# Patient Record
Sex: FEMALE | Race: ASIAN | NOT HISPANIC OR LATINO | Employment: UNEMPLOYED | URBAN - METROPOLITAN AREA
[De-identification: names, ages, dates, MRNs, and addresses within clinical notes are randomized per-mention and may not be internally consistent; named-entity substitution may affect disease eponyms.]

---

## 2017-01-06 ENCOUNTER — OFFICE VISIT - HEALTHEAST (OUTPATIENT)
Dept: AUDIOLOGY | Facility: CLINIC | Age: 37
End: 2017-01-06

## 2017-01-06 ENCOUNTER — OFFICE VISIT - HEALTHEAST (OUTPATIENT)
Dept: OTOLARYNGOLOGY | Facility: CLINIC | Age: 37
End: 2017-01-06

## 2017-01-06 DIAGNOSIS — H90.71 MIXED HEARING LOSS OF RIGHT EAR: ICD-10-CM

## 2017-01-06 ASSESSMENT — MIFFLIN-ST. JEOR: SCORE: 1459

## 2017-01-31 ENCOUNTER — COMMUNICATION - HEALTHEAST (OUTPATIENT)
Dept: ADMINISTRATIVE | Facility: CLINIC | Age: 37
End: 2017-01-31

## 2017-02-08 ENCOUNTER — OFFICE VISIT - HEALTHEAST (OUTPATIENT)
Dept: AUDIOLOGY | Facility: CLINIC | Age: 37
End: 2017-02-08

## 2017-02-08 DIAGNOSIS — H90.8 MIXED HEARING LOSS, UNILATERAL: ICD-10-CM

## 2017-02-10 ENCOUNTER — OFFICE VISIT - HEALTHEAST (OUTPATIENT)
Dept: FAMILY MEDICINE | Facility: CLINIC | Age: 37
End: 2017-02-10

## 2017-02-10 DIAGNOSIS — F17.210 CIGARETTE NICOTINE DEPENDENCE WITHOUT COMPLICATION: ICD-10-CM

## 2017-02-10 DIAGNOSIS — G89.29 CHRONIC LEFT-SIDED LOW BACK PAIN WITHOUT SCIATICA: ICD-10-CM

## 2017-02-10 DIAGNOSIS — M54.50 CHRONIC LEFT-SIDED LOW BACK PAIN WITHOUT SCIATICA: ICD-10-CM

## 2017-02-10 DIAGNOSIS — F43.10 PTSD (POST-TRAUMATIC STRESS DISORDER): ICD-10-CM

## 2017-03-17 ENCOUNTER — AMBULATORY - HEALTHEAST (OUTPATIENT)
Dept: AUDIOLOGY | Facility: CLINIC | Age: 37
End: 2017-03-17

## 2017-05-05 ENCOUNTER — COMMUNICATION - HEALTHEAST (OUTPATIENT)
Dept: FAMILY MEDICINE | Facility: CLINIC | Age: 37
End: 2017-05-05

## 2017-05-05 DIAGNOSIS — F17.210 CIGARETTE NICOTINE DEPENDENCE WITHOUT COMPLICATION: ICD-10-CM

## 2017-05-08 ENCOUNTER — COMMUNICATION - HEALTHEAST (OUTPATIENT)
Dept: FAMILY MEDICINE | Facility: CLINIC | Age: 37
End: 2017-05-08

## 2017-05-08 DIAGNOSIS — F17.210 CIGARETTE NICOTINE DEPENDENCE WITHOUT COMPLICATION: ICD-10-CM

## 2017-08-21 ENCOUNTER — COMMUNICATION - HEALTHEAST (OUTPATIENT)
Dept: FAMILY MEDICINE | Facility: CLINIC | Age: 37
End: 2017-08-21

## 2017-08-21 DIAGNOSIS — F17.210 CIGARETTE NICOTINE DEPENDENCE WITHOUT COMPLICATION: ICD-10-CM

## 2017-10-11 ENCOUNTER — OFFICE VISIT - HEALTHEAST (OUTPATIENT)
Dept: FAMILY MEDICINE | Facility: CLINIC | Age: 37
End: 2017-10-11

## 2017-10-11 DIAGNOSIS — Z23 NEED FOR VACCINATION: ICD-10-CM

## 2017-10-11 DIAGNOSIS — M22.2X1 PATELLOFEMORAL PAIN SYNDROME OF RIGHT KNEE: ICD-10-CM

## 2017-10-11 DIAGNOSIS — H04.123 DRY EYES: ICD-10-CM

## 2017-10-11 RX ORDER — POLYVINYL ALCOHOL 14 MG/ML
SOLUTION/ DROPS OPHTHALMIC
Qty: 15 ML | Refills: 11 | Status: SHIPPED | OUTPATIENT
Start: 2017-10-11

## 2017-10-11 ASSESSMENT — MIFFLIN-ST. JEOR: SCORE: 1472.61

## 2017-12-11 ENCOUNTER — COMMUNICATION - HEALTHEAST (OUTPATIENT)
Dept: FAMILY MEDICINE | Facility: CLINIC | Age: 37
End: 2017-12-11

## 2017-12-11 DIAGNOSIS — F17.210 CIGARETTE NICOTINE DEPENDENCE WITHOUT COMPLICATION: ICD-10-CM

## 2018-03-27 ENCOUNTER — COMMUNICATION - HEALTHEAST (OUTPATIENT)
Dept: FAMILY MEDICINE | Facility: CLINIC | Age: 38
End: 2018-03-27

## 2018-03-27 DIAGNOSIS — F17.210 CIGARETTE NICOTINE DEPENDENCE WITHOUT COMPLICATION: ICD-10-CM

## 2019-12-11 ENCOUNTER — OFFICE VISIT - HEALTHEAST (OUTPATIENT)
Dept: FAMILY MEDICINE | Facility: CLINIC | Age: 39
End: 2019-12-11

## 2019-12-11 DIAGNOSIS — M62.838 MUSCLE SPASM: ICD-10-CM

## 2019-12-11 DIAGNOSIS — R42 VERTIGO: ICD-10-CM

## 2019-12-11 DIAGNOSIS — R42 DIZZINESS: ICD-10-CM

## 2019-12-11 DIAGNOSIS — Z23 NEED FOR IMMUNIZATION AGAINST INFLUENZA: ICD-10-CM

## 2019-12-11 ASSESSMENT — MIFFLIN-ST. JEOR: SCORE: 1479.41

## 2020-02-20 ENCOUNTER — OFFICE VISIT - HEALTHEAST (OUTPATIENT)
Dept: FAMILY MEDICINE | Facility: CLINIC | Age: 40
End: 2020-02-20

## 2020-02-20 DIAGNOSIS — Z12.4 PAP SMEAR FOR CERVICAL CANCER SCREENING: ICD-10-CM

## 2020-02-20 ASSESSMENT — MIFFLIN-ST. JEOR: SCORE: 1495.29

## 2020-03-05 ENCOUNTER — OFFICE VISIT - HEALTHEAST (OUTPATIENT)
Dept: FAMILY MEDICINE | Facility: CLINIC | Age: 40
End: 2020-03-05

## 2020-03-05 DIAGNOSIS — Z12.4 SCREENING FOR CERVICAL CANCER: ICD-10-CM

## 2020-03-05 DIAGNOSIS — Z11.3 SCREEN FOR STD (SEXUALLY TRANSMITTED DISEASE): ICD-10-CM

## 2020-03-11 ENCOUNTER — COMMUNICATION - HEALTHEAST (OUTPATIENT)
Dept: FAMILY MEDICINE | Facility: CLINIC | Age: 40
End: 2020-03-11

## 2020-03-26 ENCOUNTER — COMMUNICATION - HEALTHEAST (OUTPATIENT)
Dept: FAMILY MEDICINE | Facility: CLINIC | Age: 40
End: 2020-03-26

## 2020-03-26 DIAGNOSIS — R42 VERTIGO: ICD-10-CM

## 2020-07-16 ENCOUNTER — OFFICE VISIT - HEALTHEAST (OUTPATIENT)
Dept: FAMILY MEDICINE | Facility: CLINIC | Age: 40
End: 2020-07-16

## 2020-07-16 DIAGNOSIS — L30.9 DERMATITIS: ICD-10-CM

## 2020-07-16 RX ORDER — CETIRIZINE HYDROCHLORIDE 10 MG/1
10 TABLET ORAL DAILY
Qty: 30 TABLET | Refills: 2 | Status: SHIPPED | OUTPATIENT
Start: 2020-07-16

## 2020-07-16 RX ORDER — TRIAMCINOLONE ACETONIDE 1 MG/G
CREAM TOPICAL
Qty: 30 G | Refills: 3 | Status: SHIPPED | OUTPATIENT
Start: 2020-07-16

## 2020-07-16 ASSESSMENT — MIFFLIN-ST. JEOR: SCORE: 1517.97

## 2020-12-23 ENCOUNTER — COMMUNICATION - HEALTHEAST (OUTPATIENT)
Dept: FAMILY MEDICINE | Facility: CLINIC | Age: 40
End: 2020-12-23

## 2020-12-23 DIAGNOSIS — R42 VERTIGO: ICD-10-CM

## 2020-12-23 DIAGNOSIS — M62.838 MUSCLE SPASM: ICD-10-CM

## 2020-12-24 RX ORDER — IBUPROFEN 600 MG/1
TABLET, FILM COATED ORAL
Qty: 50 TABLET | Refills: 1 | Status: SHIPPED | OUTPATIENT
Start: 2020-12-24

## 2020-12-24 RX ORDER — MECLIZINE HCL 25 MG/1
TABLET, CHEWABLE ORAL
Qty: 30 TABLET | Refills: 1 | Status: SHIPPED | OUTPATIENT
Start: 2020-12-24

## 2021-05-30 VITALS — BODY MASS INDEX: 34.23 KG/M2 | HEIGHT: 62 IN | WEIGHT: 186 LBS

## 2021-05-31 VITALS — HEIGHT: 62 IN | BODY MASS INDEX: 34.78 KG/M2 | WEIGHT: 189 LBS

## 2021-06-04 VITALS
HEART RATE: 75 BPM | OXYGEN SATURATION: 99 % | HEIGHT: 62 IN | TEMPERATURE: 98.1 F | RESPIRATION RATE: 16 BRPM | DIASTOLIC BLOOD PRESSURE: 80 MMHG | WEIGHT: 190.5 LBS | SYSTOLIC BLOOD PRESSURE: 110 MMHG | BODY MASS INDEX: 35.06 KG/M2

## 2021-06-04 VITALS
TEMPERATURE: 98.1 F | HEART RATE: 80 BPM | BODY MASS INDEX: 36.62 KG/M2 | OXYGEN SATURATION: 99 % | WEIGHT: 199 LBS | HEIGHT: 62 IN | DIASTOLIC BLOOD PRESSURE: 80 MMHG | SYSTOLIC BLOOD PRESSURE: 108 MMHG | RESPIRATION RATE: 16 BRPM

## 2021-06-04 VITALS
DIASTOLIC BLOOD PRESSURE: 70 MMHG | RESPIRATION RATE: 16 BRPM | BODY MASS INDEX: 35.7 KG/M2 | HEIGHT: 62 IN | HEART RATE: 72 BPM | TEMPERATURE: 98 F | SYSTOLIC BLOOD PRESSURE: 104 MMHG | OXYGEN SATURATION: 98 % | WEIGHT: 194 LBS

## 2021-06-04 NOTE — PROGRESS NOTES
"Petey Douglas is a 39 y.o. female here for vertigo    ASSESSMENT/PLAN:   Petey was seen today for dizziness, nausea and emesis.    Diagnoses and all orders for this visit:    Dizziness  Vertigo  Story and exam consistent with BPPV. Declined PT referral today, will start with meclizine PRN. If no improvement, will consider short course of lorazepam for sleep and symptoms.   -     meclizine (ANTIVERT) 25 mg tablet; Take 1 tablet (25 mg total) by mouth 3 (three) times a day as needed for dizziness or nausea.  - Consider short course of lorazepam if no improvement   - Discuss PT for vestibular rehab    Need for immunization against influenza  -     Influenza, Seasonal Quad, PF =/> 6months    Muscle spasm  -     ibuprofen (ADVIL,MOTRIN) 600 MG tablet; Take 1 tablet 3 times daily as needed for pain      Return if symptoms worsen or fail to improve.       ======================================================    SUBJECTIVE  Petey Douglas is a 39 y.o. female here for dizziness.     On Monday morning she felt fine laying in bed. Turned over suddenly and felt the room spinning.   Feeling lightheaded when she sits still, but no dizziness. Worse when laying down or when making sudden movements with her head. Has never happened before. Has vomited a few times from the room spinning. No nausea otherwise. No numbness/tingling, no weakness, no vision changes. No new hearing loss.     Was evaluated for hearing aids in 2017, but did not know when to pick them up and never followed up.     ROS  Complete 10 point review of systems negative except as noted above in HPI    Reviewed Past Medical History, Medications, Family History and Social History in Epic and up to date with no new changes.    OBJECTIVE  /80   Pulse 75   Temp 98.1  F (36.7  C) (Oral)   Resp 16   Ht 5' 1.5\" (1.562 m)   Wt 190 lb 8 oz (86.4 kg)   LMP 11/20/2019 (Within Days)   SpO2 99%   BMI 35.41 kg/m       General: Cooperative, pleasant, in no acute distress  HEENT: " PERRL, EOMI. TM normal.   Neck: no lymphadenopathy, no masses  CV: RRR, normal S1/S2, no murmur, rubs, gallops  Resp: No respiratory distress. Clear to auscultation bilaterally. No wheezes, rales, rhonchi  MSK: Normal muscle tone  Neuro: Horizontal nystagmus R>L. Hearing loss in right. No vertical nystagmus.   Skin: warm, well perfused. No rashes    LABS & IMAGES   None indicated    ======================================================    Visit was completed along with in person Madiha     Options for treatment and follow-up care were reviewed with the patient. Mu Paw and/or guardian was engaged and actively involved in the decision making process. Mu Paw and/or guardian verbalized understanding of the options discussed and was satisfied with the final plan.      Jaspal Wharton MD

## 2021-06-06 NOTE — TELEPHONE ENCOUNTER
Pt missed her pap appt with Dr. Wharton on 3/5/2020. If pt calls back, please help her reschedule.

## 2021-06-06 NOTE — PROGRESS NOTES
"Petey Douglas is a 39 y.o. female here for discuss Pap smear    ASSESSMENT/PLAN:   Petey was seen today for gynecologic exam.    Diagnoses and all orders for this visit:    Pap smear for cervical cancer screening  Patient wanted discussed what a Pap smear is, indications for exam.  She does not think she is ever had one, last child was 7 years ago and does not think she had one at that time either.  Status post tubal ligation in 2012.  No family history of cervical, ovarian, breast cancer.  Former smoker, no alcohol.  Discussed importance of screening for cancer, patient is on her period and wants to reschedule for 2 weeks.      Return in about 2 weeks (around 3/5/2020) for Pap smear.       ======================================================    SUBJECTIVE  Petey Douglas is a 39 y.o. female here for discuss Pap smear.    Patient has never had a Pap smear that she knows of.  Has had children before, last one was over 7 years ago.  No family history of breast or cervical cancer.    She is currently on her period and would like to defer this test today but wants to reschedule for another 1.    ROS  Complete 10 point review of systems negative except as noted above in HPI    Reviewed Past Medical History, Medications, Family History and Social History in Epic and up to date with no new changes.    OBJECTIVE  /70   Pulse 72   Temp 98  F (36.7  C) (Oral)   Resp 16   Ht 5' 1.5\" (1.562 m)   Wt 194 lb (88 kg)   LMP 02/17/2020 (Approximate)   SpO2 98%   BMI 36.06 kg/m       General: Cooperative, pleasant, in no acute distress  HEENT: PERRL, EOMI.    MSK: Normal muscle tone  Neuro: CN II-XII intact      LABS & IMAGES   Results for orders placed or performed in visit on 07/07/12   Hemoglobin   Result Value Ref Range    Hemoglobin 10.5 (L) 12.0 - 16.0 g/dL         ======================================================    Visit was completed along with in person Madiha     Options for treatment and follow-up care were " reviewed with the patient.  Paw and/or guardian was engaged and actively involved in the decision making process. Mu Paw and/or guardian verbalized understanding of the options discussed and was satisfied with the final plan.      Jaspal Wharton MD

## 2021-06-07 NOTE — TELEPHONE ENCOUNTER
RN cannot approve Refill Request    RN can NOT refill this medication med is not covered by policy/route to provider     . Last office visit: 3/5/2020 Jaspal Wharton MD Last Physical: Visit date not found Last MTM visit: Visit date not found Last visit same specialty: 3/5/2020 Jaspal Wharton MD.  Next visit within 3 mo: Visit date not found  Next physical within 3 mo: Visit date not found      Bertha Avalos, [facility] Triage/Med Refill 3/27/2020    Requested Prescriptions   Pending Prescriptions Disp Refills     TRAVEL SICKNESS, MECLIZINE, 25 mg chewable tablet [Pharmacy Med Name: MECLIZINE 25 MG TAB CHEW 25 TAB] 30 tablet 1     Sig: TAKE 1 TABLET (25 MG TOTAL) BY MOUTH 3 (THREE) TIMES A DAY AS NEEDED FOR DIZZINESS OR NAUSEA.       There is no refill protocol information for this order

## 2021-06-08 NOTE — PROGRESS NOTES
Audiology Report    Referring Provider:   Service    History:  Petey Douglas is seen in conjunction with ENT appointment today. 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 She is being seen to monitor her hearing since she was last tested on 9/11/15. Previous reports state the hearing loss was first noticed in 2008 following a high fever and measles. Petey reports that she has not experienced a change in hearing since last year.  She denies a history of otalgia, dizziness, history of noise exposure and otorrhea, and noise exposure.    Results:     Left Ear Right Ear   Otoscopy clear canals clear canals   Pure Tone Audiometry normal hearing   moderate sloping to profound sensorineural hearing loss   Word Recognition excellent very poor as completed through an    Tympanometry normal (Type A)  normal (Type A)     Transducer: Insert earphones    Reliability was good  and there was good  SRT to PTA agreement. Results today were stable with those found on 9/11/15 with a slight decrease in hearing from 2182-2464 Hz in the right ear.      Plan:  The patient is returned to ENT for follow up.  She should return for retesting with ENT recommendation.  The patient is a potential candidate for a BAHA or CROS device. She is welcome to return for a hearing aid evaluation if she is interested after medical clearanc is obtained.    Please see audiogram under  media  and  audiogram  in the patient s chart.     Juwan Milian., CCC-A  [place] Licensed Audiologist #[id number]

## 2021-06-08 NOTE — PROGRESS NOTES
Assessment: /    Plan:    1. PTSD (post-traumatic stress disorder)     2. Chronic left-sided low back pain without sciatica  ibuprofen (ADVIL,MOTRIN) 600 MG tablet   3. Cigarette nicotine dependence without complication  nicotine polacrilex (NICORETTE) 4 MG gum       I completed form N-648 and gave the additional to the patient to take immigration.  She has an appointment there to 20/8/17.  Kiara is assisting her.  Patient was seen with Madiha , Georgina Berger.  This was a 25 minute visit with >50% of the time spent in counseling and coordination of care regarding:  PTSD      Subjective:    HPI:  Petey Douglas is a 36-year-old female presenting for citizenship waiver.  She has PTSD.  Please see note from 6/15/16 regarding this.  She needs form N-648 completed with a new code.    She also requests refill of ibuprofen and nicotine gum.      Review of Systems:  No fever or cough.      Current Outpatient Prescriptions   Medication Sig Dispense Refill     acetaminophen (TYLENOL) 325 MG tablet Take 2 tablets (650 mg total) by mouth every 6 (six) hours as needed. 100 tablet 12     ibuprofen (ADVIL,MOTRIN) 600 MG tablet Take 1 tablet 3 times daily as needed for pain 50 tablet 11     nicotine polacrilex (NICORETTE) 4 MG gum Apply 1 each (4 mg total) to the mouth or throat as needed for smoking cessation. 100 each 2     No current facility-administered medications for this visit.          Objective:    There were no vitals filed for this visit.    Gen:  NAD, VSS  Lungs:  normal  Heart:  normal          ADDITIONAL HISTORY SUMMARIZED (2): None.  DECISION TO OBTAIN EXTRA INFORMATION (1): None.   RADIOLOGY TESTS (1): None.  LABS (1): None.  MEDICINE TESTS (1): None.  INDEPENDENT REVIEW (2 each): None.     Total Data Points: 0

## 2021-06-08 NOTE — PROGRESS NOTES
HPI:  Saw [facility] in 2015 and recommended CT scna for potential reversible hearing loss.  Ct scan showed:  CONCLUSION:  1. Mastoid air cells, middle ear and external canals clear bilaterally. Normal ossicular chains bilaterally.  2. Inner ear structures within normal limits bilaterally.  3. Mucosal thickening in the ethmoid air cells bilaterally.    Past medical history, surgical history, social history, family history, medications, and allergies have been reviewed with the patient and are documented above.    Review of Systems: a 10-system review was performed. Pertinent positives are noted in the HPI and on a separate scanned document in the chart.    PHYSICAL EXAMINATION:  GEN: no acute distress, normocephalic  EYES: extraocular movements are intact, pupils are equal and round. Sclera clear.   EARS: auricles are normally formed. The external auditory canals are clear with minimal to no cerumen. Tympanic membranes are intact bilaterally with no signs of infection, effusion, retractions, or perforations.  NEURO: CN II-XII are intact bilaterally. alert and oriented. No nystagmus. Gait is normal.  PULM: breathing comfortably on room air, normal chest expansion with respiration  HEART: regular rate and rhythm, no peripheral edema    AUDIOGRAM:   Prfound mixed loss on the right with 0% word recognition    MEDICAL DECISION-MAKING:   Nothing conspicuous to explain the conductive component on the right.  I think it is unlikely that surgery will improve the hearing considering the word recognition.  We discussed BAHA and CROS options and she would like to get more information for this.  Will get her set up with [facility].

## 2021-06-08 NOTE — PROGRESS NOTES
"Audiology only; hearing aid evaluation ([facility] patient)    Petey Douglas was accompanied today by a male, Madiha-speaking .  Medical clearance for amplification was provided by Holger Watt MD, at ENT consult dated 1-6-17.  The benefits and limitations of amplification were discussed, and a mutual decision was made for a fit with a [employer]  -in-canal device (in color \"velvet black\" [P8]), coupled to the left ear with an xS  and a generic open dome. She will also be fit with a CROS B-312 device in the right ear.   Order will be placed via phone; purchase requisition will be completed online.  Fitting was scheduled 3-10-17 at [facility].  Petey Douglas expressed verbal understanding of this information via the ; an appointment card for the fitting was also provided.    Juana Robles, [facility]-A  [place] Licensed Audiologist [id number]    "

## 2021-06-09 NOTE — PROGRESS NOTES
Patient did not keep hearing aid fitting appointment 3-10-17; no responses were obtained via phone attempts to reschedule. Devices will be returned to  at this time, as 's return date on invoice is approaching with no fitting appointments available with examiner prior to invoice date. If Mu Misael does wish to proceed with amplification, she may reschedule hearing aid evaluation to start process anew.    Juwan Robles., [facility]A  [place] Licensed Audiologist [id number]

## 2021-06-09 NOTE — PROGRESS NOTES
"ASSESSMENT:    1. Dermatitis I have prescribed cetirizine to help with itching at night and I have prescribed a medium potency corticosteroid ointment to be applied follow-up recommended in 4 weeks if her symptoms have not improved cetirizine (ZYRTEC) 10 MG tablet    triamcinolone (KENALOG) 0.1 % cream       There are no Patient Instructions on file for this visit.    No orders of the defined types were placed in this encounter.    There are no discontinued medications.    No follow-ups on file.    CHIEF COMPLAINT:  Chief Complaint   Patient presents with     Itchy skin     x3 week       HISTORY OF PRESENT ILLNESS:  Petey is a 40 y.o. female who is here today to discuss a skin rash that is primarily present on her hands arms feet and legs it started about 3 weeks ago she notes that it happens when she has been fishing or in contact with water she is been scratching and itching her arms and legs regularly.  She is never had this problem before.  She denies any shortness of breath, swelling of her lips problem swallowing headaches or diarrhea.    REVIEW OF SYSTEMS:   Skin as mentioned is itching on her arms legs hands and feet  According to patient 10 point review  All other systems are negative.    PFS:  Certified  reviewed her medical and social history as me    TOBACCO USE:  Social History     Tobacco Use   Smoking Status Former Smoker     Types: Cigarettes     Last attempt to quit: 11/21/2016     Years since quitting: 3.6   Smokeless Tobacco Former User   Tobacco Comment    3 cigarettes daily.       VITALS:  Vitals:    07/16/20 1132   BP: 108/80   Pulse: 80   Resp: 16   Temp: 98.1  F (36.7  C)   TempSrc: Oral   SpO2: 99%   Weight: 199 lb (90.3 kg)   Height: 5' 1.5\" (1.562 m)     Wt Readings from Last 3 Encounters:   07/16/20 199 lb (90.3 kg)   02/20/20 194 lb (88 kg)   12/11/19 190 lb 8 oz (86.4 kg)       PHYSICAL EXAM:  Interactive female sitting comfortably exam no acute distress  HEENT neck " supple mucous members moist no lymph enlargement noted in the neck  Respiratory system clear to auscultation equal breath sounds no wheeze no crackles  Skin warm well perfused multiple areas of excoriation noted on her ankles on her forearms on her wrists.  No evidence of any hives noted.      DATA REVIEWED:  Additional History from Old Records Summarized (2): 0  Decision to Obtain Records (1): 0  Radiology Tests Summarized or Ordered (1): 00  Labs Reviewed or Ordered (1): 0  Medicine Test Summarized or Ordered (1): 0  Independent Review of EKG or X-RAY(2 each): 0    The visit lasted a total of 15 minutes face to face with the patient. Over 50% of the time was spent counseling and educating the patient about   Dermatitis.    MEDICATIONS:  Current Outpatient Medications   Medication Sig Dispense Refill     acetaminophen (TYLENOL) 325 MG tablet Take 2 tablets (650 mg total) by mouth every 6 (six) hours as needed. 100 tablet 12     ibuprofen (ADVIL,MOTRIN) 600 MG tablet Take 1 tablet 3 times daily as needed for pain 50 tablet 11     nicotine polacrilex (NICORETTE) 4 MG gum CHEW 1 EACH (4 MG TOTAL) BY MOUTH AS NEEDED FOR SMOKING CESSATION. 100 each 2     TRAVEL SICKNESS, MECLIZINE, 25 mg chewable tablet TAKE 1 TABLET (25 MG TOTAL) BY MOUTH 3 (THREE) TIMES A DAY AS NEEDED FOR DIZZINESS OR NAUSEA. 30 tablet 1     polyvinyl alcohol (ARTIFICIAL TEARS, POLYVIN ALC,) 1.4 % ophthalmic solution 1 drop in both eyes 4 times daily 15 mL 11     No current facility-administered medications for this visit.      Elbert BALDWIN

## 2021-06-13 NOTE — PROGRESS NOTES
Assessment: /    Plan:    1. Patellofemoral pain syndrome of right knee  ibuprofen (ADVIL,MOTRIN) 600 MG tablet   2. Dry eyes  polyvinyl alcohol (ARTIFICIAL TEARS, POLYVIN ALC,) 1.4 % ophthalmic solution   3. Need for vaccination         She will use ibuprofen as needed.  She will use hot or cold packs as needed.  Avoid squatting, kneeling, taking 2 stairs at a time.  Recheck if not improving.  Patient was seen with Madiha , Angelita Garcia.      Subjective:    HPI:  Petey Douglas is a 37-year-old female presenting with right knee pain.  This has been occurring for 5 months.  Pain has been worse recently.  Pain is located in the medial aspect of the distal quadriceps.  Pain is worse when she descends stairs.  She has not been taking medication for this.  The area sometimes feels swollen.      Review of Systems: No redness of the joint, no stiffness, no other joints affected.      Current Outpatient Prescriptions   Medication Sig Dispense Refill     acetaminophen (TYLENOL) 325 MG tablet Take 2 tablets (650 mg total) by mouth every 6 (six) hours as needed. 100 tablet 12     ibuprofen (ADVIL,MOTRIN) 600 MG tablet Take 1 tablet 3 times daily as needed for pain 50 tablet 11     nicotine polacrilex (NICORETTE) 4 MG gum CHEW 1 EACH (4 MG TOTAL) BY MOUTH AS NEEDED FOR SMOKING CESSATION. 100 each 2     polyvinyl alcohol (ARTIFICIAL TEARS, POLYVIN ALC,) 1.4 % ophthalmic solution 1 drop in both eyes 4 times daily 15 mL 11     No current facility-administered medications for this visit.          Objective:    Vitals:    10/11/17 1329   BP: 114/70   Pulse: 73   Resp: 17   Temp: 98.2  F (36.8  C)   SpO2: 100%       Gen:  NAD, VSS  Eyes normal  Lungs:  normal  Heart:  normal  Right knee with slight crepitance of the patella with movement.  Tenderness of the medial distal quadriceps muscle.  No effusion, increased warmth, erythema.  No ligament instability, Lauri's negative.  Left knee is normal      ADDITIONAL HISTORY SUMMARIZED  (2): None.  DECISION TO OBTAIN EXTRA INFORMATION (1): None.   RADIOLOGY TESTS (1): None.  LABS (1): None.  MEDICINE TESTS (1): None.  INDEPENDENT REVIEW (2 each): None.     Total Data Points: 0

## 2021-06-14 NOTE — TELEPHONE ENCOUNTER
RN cannot approve Refill Request    RN can NOT refill this medication Protocol failed and NO refill given. Last office visit: 10/11/2017 Stefan Friend MD Last Physical: Visit date not found Last MTM visit: Visit date not found Last visit same specialty: 7/16/2020 Jean-Claude Bauman MD.  Next visit within 3 mo: Visit date not found  Next physical within 3 mo: Visit date not found      Milagro Zuniga, [facility] Triage/Med Refill 12/24/2020    Requested Prescriptions   Pending Prescriptions Disp Refills     TRAVEL SICKNESS, MECLIZINE, 25 mg chewable tablet [Pharmacy Med Name: MECLIZINE 25 MG TAB CHEW 25 Tablet] 30 tablet 1     Sig: TAKE 1 TABLET (25 MG TOTAL) BY MOUTH 3 (THREE) TIMES A DAY AS NEEDED FOR DIZZINESS OR NAUSEA.       There is no refill protocol information for this order

## 2021-06-14 NOTE — TELEPHONE ENCOUNTER
RN cannot approve Refill Request    RN can NOT refill this medication med is not covered by policy/route to provider. Last office visit: 3/5/2020 Jaspal Wharton MD Last Physical: Visit date not found Last MTM visit: Visit date not found Last visit same specialty: 7/16/2020 Jean-Claude Bauman MD.  Next visit within 3 mo: Visit date not found  Next physical within 3 mo: Visit date not found      Milagro Zuniga, [facility] Triage/Med Refill 12/24/2020    Requested Prescriptions   Pending Prescriptions Disp Refills     ibuprofen (ADVIL,MOTRIN) 600 MG tablet [Pharmacy Med Name: IBUPROFEN 600 MG TABS 600 Tablet] 50 tablet 11     Sig: TAKE 1 TABLET BY MOUTH 3 TIMES DAILY AS NEEDED FOR PAIN       There is no refill protocol information for this order

## 2021-06-15 PROBLEM — G89.29 CHRONIC LEFT-SIDED LOW BACK PAIN WITHOUT SCIATICA: Status: ACTIVE | Noted: 2017-02-10

## 2021-06-15 PROBLEM — M54.50 CHRONIC LEFT-SIDED LOW BACK PAIN WITHOUT SCIATICA: Status: ACTIVE | Noted: 2017-02-10

## 2021-06-15 PROBLEM — H90.71 MIXED HEARING LOSS OF RIGHT EAR: Status: ACTIVE | Noted: 2017-01-06

## 2021-06-16 PROBLEM — H04.123 DRY EYES: Status: ACTIVE | Noted: 2017-10-11

## 2021-06-16 PROBLEM — M22.2X1 PATELLOFEMORAL PAIN SYNDROME OF RIGHT KNEE: Status: ACTIVE | Noted: 2017-10-11

## 2021-07-03 NOTE — ADDENDUM NOTE
Addendum Note by Rodney Marie MD at 3/5/2020 10:00 AM     Author: Rodney Marie MD Service: -- Author Type: Physician    Filed: 3/11/2020  5:12 PM Encounter Date: 3/5/2020 Status: Signed    : Rodney Marie MD (Physician)    Addended by: RODNEY MARIE on: 3/11/2020 05:12 PM        Modules accepted: Orders, Level of Service

## 2021-07-14 ENCOUNTER — TRANSCRIBE ORDERS (OUTPATIENT)
Dept: FAMILY MEDICINE | Facility: CLINIC | Age: 41
End: 2021-07-14

## 2021-07-14 ENCOUNTER — IMMUNIZATION (OUTPATIENT)
Dept: NURSING | Facility: CLINIC | Age: 41
End: 2021-07-14
Payer: COMMERCIAL

## 2021-07-14 DIAGNOSIS — Z23 HIGH PRIORITY FOR 2019-NCOV VACCINE: Primary | ICD-10-CM

## 2021-07-14 PROCEDURE — 91301 PR COVID VAC MODERNA 100 MCG/0.5 ML IM: CPT

## 2021-07-14 PROCEDURE — 0011A PR COVID VAC MODERNA 100 MCG/0.5 ML IM: CPT

## 2021-08-11 ENCOUNTER — IMMUNIZATION (OUTPATIENT)
Dept: NURSING | Facility: CLINIC | Age: 41
End: 2021-08-11
Attending: FAMILY MEDICINE
Payer: COMMERCIAL

## 2021-08-11 PROCEDURE — 91301 PR COVID VAC MODERNA 100 MCG/0.5 ML IM: CPT

## 2021-08-11 PROCEDURE — 0012A PR COVID VAC MODERNA 100 MCG/0.5 ML IM: CPT

## 2023-12-13 ENCOUNTER — OFFICE VISIT (OUTPATIENT)
Dept: FAMILY MEDICINE | Facility: CLINIC | Age: 43
End: 2023-12-13
Payer: COMMERCIAL

## 2023-12-13 VITALS
HEIGHT: 61 IN | WEIGHT: 202 LBS | TEMPERATURE: 98.2 F | HEART RATE: 84 BPM | SYSTOLIC BLOOD PRESSURE: 120 MMHG | BODY MASS INDEX: 38.14 KG/M2 | DIASTOLIC BLOOD PRESSURE: 78 MMHG | OXYGEN SATURATION: 98 % | RESPIRATION RATE: 18 BRPM

## 2023-12-13 DIAGNOSIS — M25.461 EFFUSION OF RIGHT KNEE: ICD-10-CM

## 2023-12-13 DIAGNOSIS — Z23 NEED FOR VACCINATION: ICD-10-CM

## 2023-12-13 DIAGNOSIS — M25.561 CHRONIC PAIN OF RIGHT KNEE: Primary | ICD-10-CM

## 2023-12-13 DIAGNOSIS — G89.29 CHRONIC PAIN OF RIGHT KNEE: Primary | ICD-10-CM

## 2023-12-13 PROCEDURE — 99203 OFFICE O/P NEW LOW 30 MIN: CPT | Mod: 25 | Performed by: FAMILY MEDICINE

## 2023-12-13 PROCEDURE — 91320 SARSCV2 VAC 30MCG TRS-SUC IM: CPT | Performed by: FAMILY MEDICINE

## 2023-12-13 PROCEDURE — 90686 IIV4 VACC NO PRSV 0.5 ML IM: CPT | Performed by: FAMILY MEDICINE

## 2023-12-13 PROCEDURE — 90480 ADMN SARSCOV2 VAC 1/ONLY CMP: CPT | Performed by: FAMILY MEDICINE

## 2023-12-13 PROCEDURE — 90471 IMMUNIZATION ADMIN: CPT | Performed by: FAMILY MEDICINE

## 2023-12-13 NOTE — PROGRESS NOTES
ASSESMENT AND PLAN:  Diagnoses and all orders for this visit:  Chronic pain of right knee with Effusion of right knee  -     MR Knee Right w/o Contrast; Future  Given the effusion I do think she that she needs an MRI, patient agreeable after counseling today.  Treatment plan will depend on results of the MRI.  Need for vaccination  Immunization review and counseling done with the patient.  With the help of a professional .  -     INFLUENZA VACCINE IM > 6 MONTHS VALENT IIV4 (AFLURIA/FLUZONE)  -     COVID-19 12+ (2023-24) (PFIZER)      Reviewed the risks and benefits of the treatment plan with the patient and/or caregiver and we discussed indications for routine and emergent follow-up.        SUBJECTIVE: 43-year-old female's been having 6 months of knee pain in the right knee.  It has been associated with intermittent swelling that is visible and she can also feel the swelling.  Sometimes she feels the swelling towards the back of the knee in the popliteal area.  She cannot think of any specific injury that occurred around the time of the onset of the symptoms.  She has not had other joints involved, it is only been the right knee.  Sometimes the right knee feels unstable.    No past medical history on file.  Patient Active Problem List   Diagnosis    Vitamin D Deficiency    Difficulty concentrating    PTSD (post-traumatic stress disorder)    Mixed hearing loss of right ear    Chronic left-sided low back pain without sciatica    Patellofemoral pain syndrome of right knee    Dry eyes     Current Outpatient Medications   Medication Sig Dispense Refill    acetaminophen (TYLENOL) 325 MG tablet [ACETAMINOPHEN (TYLENOL) 325 MG TABLET] Take 2 tablets (650 mg total) by mouth every 6 (six) hours as needed. (Patient not taking: Reported on 12/13/2023) 100 tablet 12    cetirizine (ZYRTEC) 10 MG tablet [CETIRIZINE (ZYRTEC) 10 MG TABLET] Take 1 tablet (10 mg total) by mouth daily. (Patient not taking: Reported on  "12/13/2023) 30 tablet 2    ibuprofen (ADVIL,MOTRIN) 600 MG tablet [IBUPROFEN (ADVIL,MOTRIN) 600 MG TABLET] TAKE 1 TABLET BY MOUTH 3 TIMES DAILY AS NEEDED FOR PAIN (Patient not taking: Reported on 12/13/2023) 50 tablet 1    nicotine polacrilex (NICORETTE) 4 MG gum [NICOTINE POLACRILEX (NICORETTE) 4 MG GUM] CHEW 1 EACH (4 MG TOTAL) BY MOUTH AS NEEDED FOR SMOKING CESSATION. (Patient not taking: Reported on 12/13/2023) 100 each 2    polyvinyl alcohol (ARTIFICIAL TEARS, POLYVIN ALC,) 1.4 % ophthalmic solution [POLYVINYL ALCOHOL (ARTIFICIAL TEARS, POLYVIN ALC,) 1.4 % OPHTHALMIC SOLUTION] 1 drop in both eyes 4 times daily (Patient not taking: Reported on 12/13/2023) 15 mL 11    TRAVEL SICKNESS, MECLIZINE, 25 mg chewable tablet [TRAVEL SICKNESS, MECLIZINE, 25 MG CHEWABLE TABLET] TAKE 1 TABLET (25 MG TOTAL) BY MOUTH 3 (THREE) TIMES A DAY AS NEEDED FOR DIZZINESS OR NAUSEA. (Patient not taking: Reported on 12/13/2023) 30 tablet 1    triamcinolone (KENALOG) 0.1 % cream [TRIAMCINOLONE (KENALOG) 0.1 % CREAM] Apply to affected area twice daily (Patient not taking: Reported on 12/13/2023) 30 g 3     History   Smoking Status    Former    Types: Cigarettes    Quit date: 11/21/2016   Smokeless Tobacco    Former       OBJECTICE: /78   Pulse 84   Temp 98.2  F (36.8  C) (Oral)   Resp 18   Ht 1.549 m (5' 1\")   Wt 91.6 kg (202 lb)   LMP 11/20/2023 (Approximate)   SpO2 98%   BMI 38.17 kg/m       No results found for this or any previous visit (from the past 24 hour(s)).     Musculoskeletal-there is a effusion present in the right knee showing asymmetry compared to the left knee.  Lachman's exam ambiguous.  She does not have pain with Lauri's exam.  She has some discomfort and some laxity on varus and valgus stressing of the lateral collateral ligament.   CV-regular rate and rhythm   SKIN-no ulcers or vesicles overlying her area of pain      Jasen Abdi MD   "

## 2023-12-30 ENCOUNTER — HOSPITAL ENCOUNTER (OUTPATIENT)
Dept: MRI IMAGING | Facility: HOSPITAL | Age: 43
Discharge: HOME | End: 2023-12-30
Attending: FAMILY MEDICINE | Admitting: FAMILY MEDICINE
Payer: COMMERCIAL

## 2023-12-30 DIAGNOSIS — M25.561 CHRONIC PAIN OF RIGHT KNEE: ICD-10-CM

## 2023-12-30 DIAGNOSIS — G89.29 CHRONIC PAIN OF RIGHT KNEE: ICD-10-CM

## 2023-12-30 DIAGNOSIS — M25.461 EFFUSION OF RIGHT KNEE: ICD-10-CM

## 2023-12-30 PROCEDURE — 73721 MRI JNT OF LWR EXTRE W/O DYE: CPT | Mod: RT

## 2024-01-02 DIAGNOSIS — M25.461 EFFUSION OF RIGHT KNEE: Primary | ICD-10-CM

## 2024-01-02 RX ORDER — IBUPROFEN 600 MG/1
600 TABLET, FILM COATED ORAL EVERY 6 HOURS PRN
Qty: 60 TABLET | Refills: 3 | Status: SHIPPED | OUTPATIENT
Start: 2024-01-02

## 2024-11-11 ENCOUNTER — TELEPHONE (OUTPATIENT)
Dept: FAMILY MEDICINE | Facility: CLINIC | Age: 44
End: 2024-11-11

## 2024-11-11 NOTE — TELEPHONE ENCOUNTER
Patient Quality Outreach    Patient is due for the following:   Breast Cancer Screening - Mammogram  Cervical Cancer Screening - PAP Needed  Physical Preventive Adult Physical      Topic Date Due    Diptheria Tetanus Pertussis (DTAP/TDAP/TD) Vaccine (4 - Td or Tdap) 09/05/2022    Flu Vaccine (1) 09/01/2024    COVID-19 Vaccine (4 - 2024-25 season) 09/01/2024       Action(s) Taken:   Schedule a Adult Preventative    Type of outreach:    Phone, left message for patient/parent to call back.    Questions for provider review:    None           Randy Stevenson MA